# Patient Record
Sex: MALE | Race: WHITE | URBAN - METROPOLITAN AREA
[De-identification: names, ages, dates, MRNs, and addresses within clinical notes are randomized per-mention and may not be internally consistent; named-entity substitution may affect disease eponyms.]

---

## 2019-07-17 ENCOUNTER — OFFICE VISIT (OUTPATIENT)
Dept: FAMILY MEDICINE CLINIC | Facility: CLINIC | Age: 69
End: 2019-07-17
Payer: MEDICARE

## 2019-07-17 VITALS
OXYGEN SATURATION: 94 % | HEIGHT: 69.5 IN | SYSTOLIC BLOOD PRESSURE: 110 MMHG | TEMPERATURE: 99 F | BODY MASS INDEX: 35.33 KG/M2 | RESPIRATION RATE: 16 BRPM | DIASTOLIC BLOOD PRESSURE: 55 MMHG | HEART RATE: 88 BPM | WEIGHT: 244 LBS

## 2019-07-17 DIAGNOSIS — H69.81 DYSFUNCTION OF RIGHT EUSTACHIAN TUBE: Primary | ICD-10-CM

## 2019-07-17 PROCEDURE — 99202 OFFICE O/P NEW SF 15 MIN: CPT | Performed by: NURSE PRACTITIONER

## 2019-07-17 RX ORDER — OLMESARTAN MEDOXOMIL 5 MG/1
10 TABLET ORAL
COMMUNITY

## 2019-07-17 RX ORDER — TRAMADOL HYDROCHLORIDE 50 MG/1
1 TABLET ORAL
COMMUNITY
Start: 2017-09-11

## 2019-07-17 RX ORDER — GABAPENTIN 100 MG/1
CAPSULE ORAL
COMMUNITY
Start: 2019-06-04

## 2019-07-17 RX ORDER — TAMSULOSIN HYDROCHLORIDE 0.4 MG/1
0.4 CAPSULE ORAL
COMMUNITY
Start: 2017-08-16

## 2019-07-17 NOTE — PROGRESS NOTES
Dejon Coughlin is a 78year old male who presents for  right ear fullness sensation. Problem has been occurring for  1  weeks. Symptoms have been the same since onset. No sick contact. Denies ear discharge, swelling, or constant pain of the ear.   Denies HENT:  Head atraumatic, normocephalic, throat, oral mucosa are clear, no erythema. No sinus tenderness. + turbinate hypertrophy. EARS:Bilateral hearing is intact.  Bilat TMs: cloudy,no erythema, mild bulging, + air bubble noted   Bilat EACs: no tragal tende Earaches can happen without an infection. This occurs when air and fluid build up behind the eardrum causing a feeling of fullness and discomfort and reduced hearing. This is called otitis media with effusion (OME) or serous otitis media.  It means there is · You may use over-the-counter decongestants such as phenylephrine or pseudoephedrine. But they are not always helpful. Don't use nasal spray decongestants more than 3 days. Longer use can make congestion worse.  Prescription nasal sprays from your doctor d

## 2020-09-01 ENCOUNTER — OFFICE VISIT (OUTPATIENT)
Age: 70
End: 2020-09-01

## 2020-11-17 ENCOUNTER — OFFICE VISIT (OUTPATIENT)
Dept: URBAN - METROPOLITAN AREA CLINIC 7 | Facility: CLINIC | Age: 70
End: 2020-11-17

## 2020-11-19 ENCOUNTER — OFFICE VISIT (OUTPATIENT)
Dept: URBAN - METROPOLITAN AREA CLINIC 7 | Facility: CLINIC | Age: 70
End: 2020-11-19

## 2020-11-20 ENCOUNTER — OFFICE VISIT (OUTPATIENT)
Dept: URBAN - METROPOLITAN AREA CLINIC 7 | Facility: CLINIC | Age: 70
End: 2020-11-20

## 2020-11-24 ENCOUNTER — OFFICE VISIT (OUTPATIENT)
Dept: URBAN - METROPOLITAN AREA SURGERY CENTER 5 | Facility: SURGERY CENTER | Age: 70
End: 2020-11-24

## 2020-12-02 ENCOUNTER — LAB OUTSIDE AN ENCOUNTER (OUTPATIENT)
Age: 70
End: 2020-12-02

## 2020-12-03 LAB
HEPATITIS A AB, TOTAL: (no result)
HEPATITIS A IGM: (no result)
HEPATITIS B SURFACE ANTIBODY QL: (no result)
HEPATITIS C ANTIBODY: (no result)
SIGNAL TO CUT-OFF: 0.05

## 2022-07-30 ENCOUNTER — TELEPHONE ENCOUNTER (OUTPATIENT)
Age: 72
End: 2022-07-30

## 2022-07-31 ENCOUNTER — TELEPHONE ENCOUNTER (OUTPATIENT)
Age: 72
End: 2022-07-31